# Patient Record
Sex: MALE | Race: WHITE | NOT HISPANIC OR LATINO | Employment: OTHER | URBAN - METROPOLITAN AREA
[De-identification: names, ages, dates, MRNs, and addresses within clinical notes are randomized per-mention and may not be internally consistent; named-entity substitution may affect disease eponyms.]

---

## 2017-02-08 NOTE — PATIENT DISCUSSION
POAG, OU:  INTRAOCULAR PRESSURE IS WITHIN ACCEPTABLE LIMITS. PATIENT INSTRUCTED TO CONTINUE __LATANOPROST AS DIRECTED______ AND RETURN FOR FOLLOW-UP AS SCHEDULED.

## 2018-03-07 NOTE — PATIENT DISCUSSION
POAG, OU:  INTRAOCULAR PRESSURE IS WITHIN ACCEPTABLE LIMITS. PATIENT INSTRUCTED TO CONTINUE _LATANOPROST QHS, OU_______ AND RETURN FOR FOLLOW-UP AS SCHEDULED.

## 2018-03-08 ENCOUNTER — NEW PATIENT COMPREHENSIVE (OUTPATIENT)
Dept: URBAN - METROPOLITAN AREA CLINIC 35 | Facility: CLINIC | Age: 63
End: 2018-03-08

## 2018-03-08 DIAGNOSIS — H43.393: ICD-10-CM

## 2018-03-08 DIAGNOSIS — H25.813: ICD-10-CM

## 2018-03-08 DIAGNOSIS — D31.31: ICD-10-CM

## 2018-03-08 PROCEDURE — 92015 DETERMINE REFRACTIVE STATE: CPT

## 2018-03-08 PROCEDURE — G8427 DOCREV CUR MEDS BY ELIG CLIN: HCPCS

## 2018-03-08 PROCEDURE — 99204 OFFICE O/P NEW MOD 45 MIN: CPT

## 2018-03-08 PROCEDURE — 4040F PNEUMOC VAC/ADMIN/RCVD: CPT

## 2018-03-08 PROCEDURE — 92134 CPTRZ OPH DX IMG PST SGM RTA: CPT

## 2018-03-08 PROCEDURE — G8482 FLU IMMUNIZE ORDER/ADMIN: HCPCS

## 2018-03-08 ASSESSMENT — KERATOMETRY
OD_AXISANGLE2_DEGREES: 155
OS_K2POWER_DIOPTERS: 46.00
OS_AXISANGLE2_DEGREES: 52
OD_K2POWER_DIOPTERS: 45.50
OD_K1POWER_DIOPTERS: 46.25
OS_K1POWER_DIOPTERS: 46.00

## 2018-03-08 ASSESSMENT — VISUAL ACUITY
OD_PH: 20/50
OD_CC: J3
OS_CC: J3
OS_SC: CF 2FT
OD_SC: CF 2FT
OD_CC: 20/100
OS_CC: 20/80
OS_PH: 20/50

## 2018-03-08 ASSESSMENT — TONOMETRY
OS_IOP_MMHG: 10
OD_IOP_MMHG: 10

## 2018-03-22 ASSESSMENT — KERATOMETRY
OD_K2POWER_DIOPTERS: 45.50
OS_K1POWER_DIOPTERS: 46.00
OS_AXISANGLE2_DEGREES: 52
OS_K2POWER_DIOPTERS: 46.00
OD_K1POWER_DIOPTERS: 46.25
OD_AXISANGLE2_DEGREES: 155

## 2018-07-09 ENCOUNTER — CATARACT CONSULT (OUTPATIENT)
Dept: URBAN - METROPOLITAN AREA CLINIC 39 | Facility: CLINIC | Age: 63
End: 2018-07-09

## 2018-07-09 DIAGNOSIS — H25.811: ICD-10-CM

## 2018-07-09 DIAGNOSIS — H43.393: ICD-10-CM

## 2018-07-09 DIAGNOSIS — D31.31: ICD-10-CM

## 2018-07-09 DIAGNOSIS — H44.23: ICD-10-CM

## 2018-07-09 DIAGNOSIS — H35.3130: ICD-10-CM

## 2018-07-09 DIAGNOSIS — H35.413: ICD-10-CM

## 2018-07-09 DIAGNOSIS — H25.812: ICD-10-CM

## 2018-07-09 DIAGNOSIS — H35.373: ICD-10-CM

## 2018-07-09 DIAGNOSIS — H18.51: ICD-10-CM

## 2018-07-09 PROCEDURE — G8482 FLU IMMUNIZE ORDER/ADMIN: HCPCS

## 2018-07-09 PROCEDURE — 92286 ANT SGM IMG I&R SPECLR MIC: CPT

## 2018-07-09 PROCEDURE — 92025-2 CORNEAL TOPOGRAPHY, PT

## 2018-07-09 PROCEDURE — 92134 CPTRZ OPH DX IMG PST SGM RTA: CPT

## 2018-07-09 PROCEDURE — 4040F PNEUMOC VAC/ADMIN/RCVD: CPT

## 2018-07-09 PROCEDURE — G8756 NO BP MEASURE DOC: HCPCS

## 2018-07-09 PROCEDURE — 99214 OFFICE O/P EST MOD 30 MIN: CPT

## 2018-07-09 PROCEDURE — 92136TC INTERFEROMETRY - TECHNICAL COMPONENT

## 2018-07-09 PROCEDURE — G8427 DOCREV CUR MEDS BY ELIG CLIN: HCPCS

## 2018-07-09 RX ORDER — MOXIFLOXACIN HYDROCHLORIDE 5 MG/ML: 1 SOLUTION/ DROPS OPHTHALMIC

## 2018-07-09 RX ORDER — DUREZOL 0.5 MG/ML: 1 EMULSION OPHTHALMIC TWICE A DAY

## 2018-07-09 RX ORDER — NEPAFENAC 3 MG/ML: 1 SUSPENSION/ DROPS OPHTHALMIC ONCE A DAY

## 2018-07-09 ASSESSMENT — VISUAL ACUITY
OS_CC: J3
OS_SC: CF 2FT
OS_SC: J<8
OD_SC: CF 2FT
OD_CC: J7
OS_CC: 20/40
OD_BAT: 20/400
OS_AM: 20/25
OD_CC: 20/50-2
OD_PAM: 20/30
OS_BAT: 20/400
OD_SC: J<8

## 2018-07-09 ASSESSMENT — TONOMETRY
OD_IOP_MMHG: 09
OS_IOP_MMHG: 10

## 2018-07-30 ENCOUNTER — SURGERY/PROCEDURE (OUTPATIENT)
Dept: URBAN - METROPOLITAN AREA CLINIC 39 | Facility: CLINIC | Age: 63
End: 2018-07-30

## 2018-07-30 DIAGNOSIS — H25.812: ICD-10-CM

## 2018-07-30 PROCEDURE — 66999LNSR LENSAR LASER FOR CAT SX

## 2018-07-30 PROCEDURE — 65772LRI LRI DURING CAT SX

## 2018-07-30 PROCEDURE — 66984CV REMOVE CATARACT, INSERT LENS, CUSTOM VISION

## 2018-07-30 ASSESSMENT — KERATOMETRY
OD_K1POWER_DIOPTERS: 46.25
OS_K1POWER_DIOPTERS: 46.00
OD_K2POWER_DIOPTERS: 45.50
OD_AXISANGLE2_DEGREES: 155
OS_AXISANGLE2_DEGREES: 52
OS_K2POWER_DIOPTERS: 46.00

## 2018-07-31 ENCOUNTER — SURGERY/PROCEDURE (OUTPATIENT)
Dept: URBAN - METROPOLITAN AREA CLINIC 39 | Facility: CLINIC | Age: 63
End: 2018-07-31

## 2018-07-31 ENCOUNTER — POST OP/EVAL OF SECOND EYE (OUTPATIENT)
Dept: URBAN - METROPOLITAN AREA CLINIC 39 | Facility: CLINIC | Age: 63
End: 2018-07-31

## 2018-07-31 DIAGNOSIS — H25.811: ICD-10-CM

## 2018-07-31 PROBLEM — Z96.1: Noted: 2018-07-31

## 2018-07-31 PROCEDURE — 65772LRI LRI DURING CAT SX

## 2018-07-31 PROCEDURE — 92012 INTRM OPH EXAM EST PATIENT: CPT

## 2018-07-31 PROCEDURE — G8428 CUR MEDS NOT DOCUMENT: HCPCS

## 2018-07-31 PROCEDURE — G8756 NO BP MEASURE DOC: HCPCS

## 2018-07-31 PROCEDURE — 66999LNSR LENSAR LASER FOR CAT SX

## 2018-07-31 PROCEDURE — 66984CV REMOVE CATARACT, INSERT LENS, CUSTOM VISION

## 2018-07-31 ASSESSMENT — KERATOMETRY
OD_AXISANGLE2_DEGREES: 155
OD_K2POWER_DIOPTERS: 45.50
OS_K2POWER_DIOPTERS: 46.00
OD_K1POWER_DIOPTERS: 46.25
OS_AXISANGLE2_DEGREES: 52
OS_K2POWER_DIOPTERS: 46.00
OD_K1POWER_DIOPTERS: 46.25
OD_K2POWER_DIOPTERS: 45.50
OS_AXISANGLE2_DEGREES: 52
OD_AXISANGLE2_DEGREES: 155
OS_K1POWER_DIOPTERS: 46.00
OS_K1POWER_DIOPTERS: 46.00

## 2018-07-31 ASSESSMENT — VISUAL ACUITY
OD_SC: J1 @ 3"
OS_SC: 20/40
OD_BAT: 20/400
OD_SC: CF 2FT
OS_SC: J12 @ 16"

## 2018-07-31 ASSESSMENT — TONOMETRY
OS_IOP_MMHG: 15
OD_IOP_MMHG: 15

## 2018-08-01 ENCOUNTER — 1 DAY POST-OP (OUTPATIENT)
Dept: URBAN - METROPOLITAN AREA CLINIC 35 | Facility: CLINIC | Age: 63
End: 2018-08-01

## 2018-08-01 DIAGNOSIS — Z96.1: ICD-10-CM

## 2018-08-01 PROCEDURE — 99024 POSTOP FOLLOW-UP VISIT: CPT

## 2018-08-01 ASSESSMENT — KERATOMETRY
OD_AXISANGLE2_DEGREES: 155
OS_AXISANGLE2_DEGREES: 52
OS_K1POWER_DIOPTERS: 46.00
OD_K2POWER_DIOPTERS: 45.50
OS_K2POWER_DIOPTERS: 46.00
OD_K1POWER_DIOPTERS: 46.25

## 2018-08-01 ASSESSMENT — VISUAL ACUITY
OU_SC: J3
OD_SC: CF 2FT
OU_SC: 20/30-2
OS_SC: 20/30-1

## 2018-08-01 ASSESSMENT — TONOMETRY
OD_IOP_MMHG: 15
OS_IOP_MMHG: 16

## 2018-08-08 ENCOUNTER — POST-OP CATARACT (OUTPATIENT)
Dept: URBAN - METROPOLITAN AREA CLINIC 35 | Facility: CLINIC | Age: 63
End: 2018-08-08

## 2018-08-08 DIAGNOSIS — Z96.1: ICD-10-CM

## 2018-08-08 PROCEDURE — 99024 POSTOP FOLLOW-UP VISIT: CPT

## 2018-08-08 ASSESSMENT — VISUAL ACUITY
OD_SC: 20/200
OU_SC: 20/30
OS_SC: 20/30-1
OU_SC: J2-

## 2018-08-08 ASSESSMENT — TONOMETRY
OD_IOP_MMHG: 15
OS_IOP_MMHG: 15

## 2018-08-08 ASSESSMENT — KERATOMETRY
OD_AXISANGLE2_DEGREES: 155
OS_AXISANGLE2_DEGREES: 52
OD_K1POWER_DIOPTERS: 46.25
OD_K2POWER_DIOPTERS: 45.50
OS_K1POWER_DIOPTERS: 46.00
OS_K2POWER_DIOPTERS: 46.00

## 2018-10-31 NOTE — PATIENT DISCUSSION
POAG, OU:  INTRAOCULAR PRESSURE IS WITHIN ACCEPTABLE LIMITS. PATIENT INSTRUCTED TO CONTINUE _Latanaprost OU qhs_______ AND RETURN FOR FOLLOW-UP AS SCHEDULED.

## 2019-02-07 NOTE — PATIENT DISCUSSION
Diabetes without Retinopathy Counseling:  I have discussed with the patient the importance of controlling blood glucose to minimize the risk of developing retinal disease from diabetes. Explained the importance of annual dilated eye exams. Return for follow-up as scheduled. no

## 2022-10-21 ASSESSMENT — KERATOMETRY
OS_AXISANGLE2_DEGREES: 52
OS_K1POWER_DIOPTERS: 46.00
OD_K1POWER_DIOPTERS: 46.25
OD_K2POWER_DIOPTERS: 45.50
OS_K2POWER_DIOPTERS: 46.00
OD_AXISANGLE2_DEGREES: 155

## 2022-10-24 ENCOUNTER — NEW PATIENT (OUTPATIENT)
Dept: URBAN - METROPOLITAN AREA CLINIC 37 | Facility: CLINIC | Age: 67
End: 2022-10-24

## 2022-10-24 DIAGNOSIS — H52.13: ICD-10-CM

## 2022-10-24 PROCEDURE — 92015 DETERMINE REFRACTIVE STATE: CPT

## 2022-10-24 PROCEDURE — 92014 COMPRE OPH EXAM EST PT 1/>: CPT

## 2022-10-24 ASSESSMENT — TONOMETRY
OS_IOP_MMHG: 13
OD_IOP_MMHG: 13

## 2022-10-24 ASSESSMENT — VISUAL ACUITY
OD_SC: 20/300
OU_SC: 20/200
OS_SC: 20/300

## 2022-12-04 ASSESSMENT — KERATOMETRY
OS_K2POWER_DIOPTERS: 46.00
OD_K2POWER_DIOPTERS: 45.50
OS_K1POWER_DIOPTERS: 46.00
OD_K1POWER_DIOPTERS: 46.25
OS_AXISANGLE2_DEGREES: 52
OD_AXISANGLE2_DEGREES: 155

## 2022-12-05 ENCOUNTER — SURGERY/PROCEDURE (OUTPATIENT)
Dept: URBAN - METROPOLITAN AREA SURGERY 14 | Facility: SURGERY | Age: 67
End: 2022-12-05

## 2022-12-05 ENCOUNTER — COMPREHENSIVE EXAM (OUTPATIENT)
Dept: URBAN - METROPOLITAN AREA CLINIC 39 | Facility: CLINIC | Age: 67
End: 2022-12-05

## 2022-12-05 PROCEDURE — 6682150 YAG CAPSULOTOMY

## 2022-12-05 PROCEDURE — 99214 OFFICE O/P EST MOD 30 MIN: CPT

## 2022-12-05 ASSESSMENT — VISUAL ACUITY
OD_SC: 20/70
OS_SC: 20/400

## 2022-12-05 ASSESSMENT — TONOMETRY
OD_IOP_MMHG: 10
OS_IOP_MMHG: 10

## 2022-12-09 ASSESSMENT — KERATOMETRY
OD_K2POWER_DIOPTERS: 45.50
OS_AXISANGLE2_DEGREES: 52
OS_K1POWER_DIOPTERS: 46.00
OD_K1POWER_DIOPTERS: 46.25
OD_AXISANGLE2_DEGREES: 155
OS_K2POWER_DIOPTERS: 46.00